# Patient Record
Sex: FEMALE | Race: OTHER
[De-identification: names, ages, dates, MRNs, and addresses within clinical notes are randomized per-mention and may not be internally consistent; named-entity substitution may affect disease eponyms.]

---

## 2018-01-22 ENCOUNTER — HOSPITAL ENCOUNTER (OUTPATIENT)
Dept: HOSPITAL 8 - LDOP | Age: 28
Discharge: HOME | End: 2018-01-22
Attending: OBSTETRICS & GYNECOLOGY
Payer: COMMERCIAL

## 2018-01-22 VITALS — WEIGHT: 159.17 LBS | BODY MASS INDEX: 29.29 KG/M2 | HEIGHT: 62 IN

## 2018-01-22 VITALS — SYSTOLIC BLOOD PRESSURE: 119 MMHG | DIASTOLIC BLOOD PRESSURE: 70 MMHG

## 2018-01-22 DIAGNOSIS — O46.93: Primary | ICD-10-CM

## 2018-01-22 DIAGNOSIS — Z3A.36: ICD-10-CM

## 2018-01-22 LAB — MICROSCOPIC: (no result)

## 2018-01-22 PROCEDURE — 81001 URINALYSIS AUTO W/SCOPE: CPT

## 2018-01-22 PROCEDURE — G0463 HOSPITAL OUTPT CLINIC VISIT: HCPCS

## 2018-01-22 PROCEDURE — 99201: CPT

## 2018-01-22 PROCEDURE — 59025 FETAL NON-STRESS TEST: CPT

## 2018-01-22 PROCEDURE — 87086 URINE CULTURE/COLONY COUNT: CPT

## 2018-03-16 ENCOUNTER — HOSPITAL ENCOUNTER (INPATIENT)
Dept: HOSPITAL 8 - LDOP | Age: 28
LOS: 2 days | Discharge: HOME | End: 2018-03-18
Attending: OBSTETRICS & GYNECOLOGY | Admitting: OBSTETRICS & GYNECOLOGY
Payer: COMMERCIAL

## 2018-03-16 VITALS — DIASTOLIC BLOOD PRESSURE: 66 MMHG | SYSTOLIC BLOOD PRESSURE: 107 MMHG

## 2018-03-16 VITALS — HEIGHT: 62 IN | BODY MASS INDEX: 30.43 KG/M2 | WEIGHT: 165.35 LBS

## 2018-03-16 DIAGNOSIS — Z3A.38: ICD-10-CM

## 2018-03-16 LAB
BASOPHILS # BLD AUTO: 0.02 X10^3/UL (ref 0–0.1)
BASOPHILS NFR BLD AUTO: 0 % (ref 0–1)
EOSINOPHIL # BLD AUTO: 0.04 X10^3/UL (ref 0–0.4)
EOSINOPHIL NFR BLD AUTO: 1 % (ref 1–7)
ERYTHROCYTE [DISTWIDTH] IN BLOOD BY AUTOMATED COUNT: 12.9 % (ref 9.6–15.2)
LYMPHOCYTES # BLD AUTO: 2.37 X10^3/UL (ref 1–3.4)
LYMPHOCYTES NFR BLD AUTO: 25 % (ref 22–44)
MCH RBC QN AUTO: 29 PG (ref 27–34.8)
MCHC RBC AUTO-ENTMCNC: 33.6 G/DL (ref 32.4–35.8)
MCV RBC AUTO: 86.3 FL (ref 80–100)
MD: NO
MONOCYTES # BLD AUTO: 0.65 X10^3/UL (ref 0.2–0.8)
MONOCYTES NFR BLD AUTO: 7 % (ref 2–9)
NEUTROPHILS # BLD AUTO: 6.49 X10^3/UL (ref 1.8–6.8)
NEUTROPHILS NFR BLD AUTO: 68 % (ref 42–75)
PLATELET # BLD AUTO: 226 X10^3/UL (ref 130–400)
PMV BLD AUTO: 8.8 FL (ref 7.4–10.4)
RBC # BLD AUTO: 4.04 X10^6/UL (ref 3.82–5.3)

## 2018-03-16 PROCEDURE — 86900 BLOOD TYPING SEROLOGIC ABO: CPT

## 2018-03-16 PROCEDURE — 3E0R3BZ INTRODUCTION OF ANESTHETIC AGENT INTO SPINAL CANAL, PERCUTANEOUS APPROACH: ICD-10-PCS

## 2018-03-16 PROCEDURE — 36415 COLL VENOUS BLD VENIPUNCTURE: CPT

## 2018-03-16 PROCEDURE — 0KQM0ZZ REPAIR PERINEUM MUSCLE, OPEN APPROACH: ICD-10-PCS | Performed by: OBSTETRICS & GYNECOLOGY

## 2018-03-16 PROCEDURE — 00HU33Z INSERTION OF INFUSION DEVICE INTO SPINAL CANAL, PERCUTANEOUS APPROACH: ICD-10-PCS

## 2018-03-16 PROCEDURE — 86850 RBC ANTIBODY SCREEN: CPT

## 2018-03-16 PROCEDURE — 89060 EXAM SYNOVIAL FLUID CRYSTALS: CPT

## 2018-03-16 PROCEDURE — 85025 COMPLETE CBC W/AUTO DIFF WBC: CPT

## 2018-03-16 RX ADMIN — SODIUM CHLORIDE, SODIUM LACTATE, POTASSIUM CHLORIDE, AND CALCIUM CHLORIDE SCH MLS/HR: .6; .31; .03; .02 INJECTION, SOLUTION INTRAVENOUS at 13:39

## 2018-03-16 RX ADMIN — SODIUM CHLORIDE, SODIUM LACTATE, POTASSIUM CHLORIDE, CALCIUM CHLORIDE, AND DEXTROSE MONOHYDRATE SCH MLS/HR: 600; 310; 30; 20; 5 INJECTION, SOLUTION INTRAVENOUS at 05:39

## 2018-03-16 RX ADMIN — Medication SCH MLS/HR: at 20:49

## 2018-03-16 RX ADMIN — IBUPROFEN PRN MG: 600 TABLET ORAL at 20:20

## 2018-03-16 RX ADMIN — SODIUM CHLORIDE, SODIUM LACTATE, POTASSIUM CHLORIDE, AND CALCIUM CHLORIDE PRN ML: .6; .31; .03; .02 INJECTION, SOLUTION INTRAVENOUS at 18:08

## 2018-03-16 RX ADMIN — SODIUM CHLORIDE, SODIUM LACTATE, POTASSIUM CHLORIDE, AND CALCIUM CHLORIDE SCH MLS/HR: .6; .31; .03; .02 INJECTION, SOLUTION INTRAVENOUS at 10:55

## 2018-03-16 RX ADMIN — SODIUM CHLORIDE, SODIUM LACTATE, POTASSIUM CHLORIDE, AND CALCIUM CHLORIDE SCH MLS/HR: .6; .31; .03; .02 INJECTION, SOLUTION INTRAVENOUS at 18:55

## 2018-03-16 RX ADMIN — SODIUM CHLORIDE, SODIUM LACTATE, POTASSIUM CHLORIDE, AND CALCIUM CHLORIDE SCH MLS/HR: .6; .31; .03; .02 INJECTION, SOLUTION INTRAVENOUS at 05:56

## 2018-03-16 RX ADMIN — SODIUM CHLORIDE, SODIUM LACTATE, POTASSIUM CHLORIDE, CALCIUM CHLORIDE, AND DEXTROSE MONOHYDRATE SCH MLS/HR: 600; 310; 30; 20; 5 INJECTION, SOLUTION INTRAVENOUS at 13:39

## 2018-03-16 RX ADMIN — SODIUM CHLORIDE, SODIUM LACTATE, POTASSIUM CHLORIDE, AND CALCIUM CHLORIDE PRN ML: .6; .31; .03; .02 INJECTION, SOLUTION INTRAVENOUS at 11:10

## 2018-03-17 VITALS — SYSTOLIC BLOOD PRESSURE: 108 MMHG | DIASTOLIC BLOOD PRESSURE: 69 MMHG

## 2018-03-17 VITALS — SYSTOLIC BLOOD PRESSURE: 103 MMHG | DIASTOLIC BLOOD PRESSURE: 68 MMHG

## 2018-03-17 VITALS — DIASTOLIC BLOOD PRESSURE: 67 MMHG | SYSTOLIC BLOOD PRESSURE: 108 MMHG

## 2018-03-17 VITALS — SYSTOLIC BLOOD PRESSURE: 105 MMHG | DIASTOLIC BLOOD PRESSURE: 66 MMHG

## 2018-03-17 LAB
BASOPHILS # BLD AUTO: 0.02 X10^3/UL (ref 0–0.1)
BASOPHILS NFR BLD AUTO: 0 % (ref 0–1)
EOSINOPHIL # BLD AUTO: 0.04 X10^3/UL (ref 0–0.4)
EOSINOPHIL NFR BLD AUTO: 0 % (ref 1–7)
ERYTHROCYTE [DISTWIDTH] IN BLOOD BY AUTOMATED COUNT: 13 % (ref 9.6–15.2)
LYMPHOCYTES # BLD AUTO: 2.77 X10^3/UL (ref 1–3.4)
LYMPHOCYTES NFR BLD AUTO: 16 % (ref 22–44)
MCH RBC QN AUTO: 29.7 PG (ref 27–34.8)
MCHC RBC AUTO-ENTMCNC: 34 G/DL (ref 32.4–35.8)
MCV RBC AUTO: 87.2 FL (ref 80–100)
MD: (no result)
MONOCYTES # BLD AUTO: 1.55 X10^3/UL (ref 0.2–0.8)
MONOCYTES NFR BLD AUTO: 9 % (ref 2–9)
NEUTROPHILS # BLD AUTO: 13.21 X10^3/UL (ref 1.8–6.8)
NEUTROPHILS NFR BLD AUTO: 75 % (ref 42–75)
PLATELET # BLD AUTO: 201 X10^3/UL (ref 130–400)
PMV BLD AUTO: 8.6 FL (ref 7.4–10.4)
RBC # BLD AUTO: 3.85 X10^6/UL (ref 3.82–5.3)

## 2018-03-17 RX ADMIN — DOCUSATE SODIUM PRN MG: 100 CAPSULE, LIQUID FILLED ORAL at 21:27

## 2018-03-17 RX ADMIN — Medication SCH MLS/HR: at 06:06

## 2018-03-17 RX ADMIN — DOCUSATE SODIUM PRN MG: 100 CAPSULE, LIQUID FILLED ORAL at 07:35

## 2018-03-17 RX ADMIN — SODIUM CHLORIDE, SODIUM LACTATE, POTASSIUM CHLORIDE, AND CALCIUM CHLORIDE SCH MLS/HR: .6; .31; .03; .02 INJECTION, SOLUTION INTRAVENOUS at 02:55

## 2018-03-17 RX ADMIN — PRENATAL VIT W/ FE FUMARATE-FA TAB 27-0.8 MG SCH EACH: 27-0.8 TAB at 07:34

## 2018-03-17 RX ADMIN — IBUPROFEN PRN MG: 600 TABLET ORAL at 06:36

## 2018-03-17 RX ADMIN — IBUPROFEN PRN MG: 600 TABLET ORAL at 21:27

## 2018-03-17 RX ADMIN — IBUPROFEN PRN MG: 600 TABLET ORAL at 15:11

## 2018-03-18 RX ADMIN — DOCUSATE SODIUM PRN MG: 100 CAPSULE, LIQUID FILLED ORAL at 07:47

## 2018-03-18 RX ADMIN — PRENATAL VIT W/ FE FUMARATE-FA TAB 27-0.8 MG SCH EACH: 27-0.8 TAB at 07:47

## 2023-01-20 ENCOUNTER — NON-PROVIDER VISIT (OUTPATIENT)
Dept: URGENT CARE | Facility: PHYSICIAN GROUP | Age: 33
End: 2023-01-20

## 2023-01-20 DIAGNOSIS — Z02.1 PRE-EMPLOYMENT DRUG SCREENING: ICD-10-CM

## 2023-01-20 DIAGNOSIS — Z02.83 ENCOUNTER FOR DRUG SCREENING: ICD-10-CM

## 2023-01-20 LAB
AMP AMPHETAMINE: NORMAL
COC COCAINE: NORMAL
INT CON NEG: NORMAL
INT CON POS: NORMAL
MET METHAMPHETAMINES: NORMAL
OPI OPIATES: NORMAL
PCP PHENCYCLIDINE: NORMAL
POC DRUG COMMENT 753798-OCCUPATIONAL HEALTH: NEGATIVE
THC: NORMAL

## 2023-01-20 PROCEDURE — 80305 DRUG TEST PRSMV DIR OPT OBS: CPT | Performed by: PHYSICIAN ASSISTANT

## 2023-01-24 ENCOUNTER — HOSPITAL ENCOUNTER (OUTPATIENT)
Dept: RADIOLOGY | Facility: MEDICAL CENTER | Age: 33
End: 2023-01-24
Attending: OTOLARYNGOLOGY
Payer: COMMERCIAL

## 2023-01-24 DIAGNOSIS — J32.9 CHRONIC SINUSITIS, UNSPECIFIED LOCATION: ICD-10-CM

## 2023-01-24 PROCEDURE — 70486 CT MAXILLOFACIAL W/O DYE: CPT

## 2025-06-24 ENCOUNTER — APPOINTMENT (OUTPATIENT)
Dept: URBAN - METROPOLITAN AREA CLINIC 22 | Facility: CLINIC | Age: 35
Setting detail: DERMATOLOGY
End: 2025-06-24

## 2025-06-24 DIAGNOSIS — L82.1 OTHER SEBORRHEIC KERATOSIS: ICD-10-CM

## 2025-06-24 DIAGNOSIS — D22 MELANOCYTIC NEVI: ICD-10-CM

## 2025-06-24 DIAGNOSIS — Z71.89 OTHER SPECIFIED COUNSELING: ICD-10-CM

## 2025-06-24 DIAGNOSIS — L91.8 OTHER HYPERTROPHIC DISORDERS OF THE SKIN: ICD-10-CM

## 2025-06-24 DIAGNOSIS — B07.0 PLANTAR WART: ICD-10-CM

## 2025-06-24 DIAGNOSIS — D18.0 HEMANGIOMA: ICD-10-CM

## 2025-06-24 DIAGNOSIS — L81.4 OTHER MELANIN HYPERPIGMENTATION: ICD-10-CM

## 2025-06-24 PROBLEM — D18.01 HEMANGIOMA OF SKIN AND SUBCUTANEOUS TISSUE: Status: ACTIVE | Noted: 2025-06-24

## 2025-06-24 PROBLEM — D22.5 MELANOCYTIC NEVI OF TRUNK: Status: ACTIVE | Noted: 2025-06-24

## 2025-06-24 PROCEDURE — ? ADDITIONAL NOTES

## 2025-06-24 PROCEDURE — ? SUNSCREEN RECOMMENDATIONS

## 2025-06-24 PROCEDURE — ? LIQUID NITROGEN

## 2025-06-24 PROCEDURE — ? COUNSELING

## 2025-06-24 ASSESSMENT — LOCATION DETAILED DESCRIPTION DERM
LOCATION DETAILED: LEFT LATERAL ABDOMEN
LOCATION DETAILED: RIGHT LATERAL SUPERIOR CHEST
LOCATION DETAILED: RIGHT DISTAL PRETIBIAL REGION
LOCATION DETAILED: RIGHT POSTERIOR AXILLA
LOCATION DETAILED: RIGHT MEDIAL PLANTAR 1ST TOE
LOCATION DETAILED: RIGHT RIB CAGE
LOCATION DETAILED: LEFT INFERIOR UPPER BACK
LOCATION DETAILED: RIGHT AXILLARY VAULT
LOCATION DETAILED: LEFT PROXIMAL DORSAL FOREARM
LOCATION DETAILED: RIGHT MID-UPPER BACK

## 2025-06-24 ASSESSMENT — LOCATION ZONE DERM
LOCATION ZONE: TRUNK
LOCATION ZONE: AXILLAE
LOCATION ZONE: LEG
LOCATION ZONE: ARM
LOCATION ZONE: TOE

## 2025-06-24 ASSESSMENT — LOCATION SIMPLE DESCRIPTION DERM
LOCATION SIMPLE: RIGHT AXILLARY VAULT
LOCATION SIMPLE: RIGHT POSTERIOR AXILLA
LOCATION SIMPLE: ABDOMEN
LOCATION SIMPLE: LEFT FOREARM
LOCATION SIMPLE: RIGHT PRETIBIAL REGION
LOCATION SIMPLE: RIGHT UPPER BACK
LOCATION SIMPLE: CHEST
LOCATION SIMPLE: PLANTAR SURFACE OF RIGHT 1ST TOE
LOCATION SIMPLE: LEFT UPPER BACK

## 2025-06-24 NOTE — PROCEDURE: ADDITIONAL NOTES
Yes
Render Risk Assessment In Note?: no
Additional Notes: Pt to call if she’d like to have wart treated after summer activities.
Detail Level: Simple

## 2025-06-24 NOTE — PROCEDURE: LIQUID NITROGEN
Render Note In Bullet Format When Appropriate: No
Duration Of Freeze Thaw-Cycle (Seconds): 0
Show Spray Paint Technique Variable?: Yes
Detail Level: Detailed
Post-Care Instructions: I reviewed with the patient in detail post-care instructions. Patient is to wear sunprotection, and avoid picking at any of the treated lesions. Pt may apply Vaseline to crusted or scabbing areas.
Medical Necessity Clause: This procedure was medically necessary because the lesions that were treated were:
Consent: The patient's consent was obtained including but not limited to risks of crusting, scabbing, blistering, scarring, darker or lighter pigmentary change, recurrence, incomplete removal and infection.
Spray Paint Text: The liquid nitrogen was applied to the skin utilizing a spray paint frosting technique.
Medical Necessity Information: It is in your best interest to select a reason for this procedure from the list below. All of these items fulfill various CMS LCD requirements except the new and changing color options.